# Patient Record
Sex: MALE | Race: BLACK OR AFRICAN AMERICAN | NOT HISPANIC OR LATINO | Employment: STUDENT | URBAN - METROPOLITAN AREA
[De-identification: names, ages, dates, MRNs, and addresses within clinical notes are randomized per-mention and may not be internally consistent; named-entity substitution may affect disease eponyms.]

---

## 2023-03-21 ENCOUNTER — OFFICE VISIT (OUTPATIENT)
Dept: PRIMARY CARE | Facility: CLINIC | Age: 18
End: 2023-03-21
Payer: COMMERCIAL

## 2023-03-21 VITALS
OXYGEN SATURATION: 99 % | DIASTOLIC BLOOD PRESSURE: 82 MMHG | SYSTOLIC BLOOD PRESSURE: 137 MMHG | TEMPERATURE: 98.1 F | BODY MASS INDEX: 37.7 KG/M2 | HEART RATE: 66 BPM | WEIGHT: 240.2 LBS | HEIGHT: 67 IN

## 2023-03-21 DIAGNOSIS — R05.1 ACUTE COUGH: ICD-10-CM

## 2023-03-21 DIAGNOSIS — J45.20 MILD INTERMITTENT ASTHMA WITHOUT COMPLICATION (HHS-HCC): ICD-10-CM

## 2023-03-21 DIAGNOSIS — J02.9 PHARYNGITIS, UNSPECIFIED ETIOLOGY: Primary | ICD-10-CM

## 2023-03-21 PROBLEM — J30.9 ALLERGIC RHINITIS: Status: ACTIVE | Noted: 2023-03-21

## 2023-03-21 PROBLEM — K59.09 CHRONIC CONSTIPATION: Status: ACTIVE | Noted: 2023-03-21

## 2023-03-21 PROBLEM — M54.9 BACK PAIN: Status: ACTIVE | Noted: 2023-03-21

## 2023-03-21 PROBLEM — F90.9 ADHD: Status: ACTIVE | Noted: 2023-03-21

## 2023-03-21 PROBLEM — E66.9 CLASS 2 OBESITY: Status: ACTIVE | Noted: 2023-03-21

## 2023-03-21 PROBLEM — R10.9 ABDOMINAL CRAMPING: Status: ACTIVE | Noted: 2023-03-21

## 2023-03-21 PROBLEM — L30.9 ECZEMA: Status: ACTIVE | Noted: 2023-03-21

## 2023-03-21 PROBLEM — J45.909 ASTHMA (HHS-HCC): Status: ACTIVE | Noted: 2023-03-21

## 2023-03-21 PROBLEM — K21.9 ACID REFLUX: Status: ACTIVE | Noted: 2023-03-21

## 2023-03-21 PROBLEM — E55.9 VITAMIN D DEFICIENCY: Status: ACTIVE | Noted: 2023-03-21

## 2023-03-21 LAB — GROUP A STREP SCREEN, CULTURE: NORMAL

## 2023-03-21 PROCEDURE — 1036F TOBACCO NON-USER: CPT | Performed by: STUDENT IN AN ORGANIZED HEALTH CARE EDUCATION/TRAINING PROGRAM

## 2023-03-21 PROCEDURE — 99213 OFFICE O/P EST LOW 20 MIN: CPT | Performed by: STUDENT IN AN ORGANIZED HEALTH CARE EDUCATION/TRAINING PROGRAM

## 2023-03-21 RX ORDER — ATOMOXETINE 40 MG/1
40 CAPSULE ORAL DAILY
COMMUNITY
Start: 2023-02-24

## 2023-03-21 RX ORDER — IBUPROFEN 600 MG/1
1 TABLET ORAL
COMMUNITY
Start: 2022-11-30

## 2023-03-21 RX ORDER — ALBUTEROL SULFATE 90 UG/1
2 AEROSOL, METERED RESPIRATORY (INHALATION) EVERY 6 HOURS PRN
Qty: 18 G | Refills: 3 | Status: SHIPPED | OUTPATIENT
Start: 2023-03-21

## 2023-03-21 RX ORDER — BUSPIRONE HYDROCHLORIDE 7.5 MG/1
1 TABLET ORAL 2 TIMES DAILY
COMMUNITY
Start: 2023-02-24

## 2023-03-21 RX ORDER — GUAIFENESIN 100 MG/5ML
200 SOLUTION ORAL 3 TIMES DAILY PRN
Qty: 120 ML | Refills: 0 | Status: SHIPPED | OUTPATIENT
Start: 2023-03-21 | End: 2023-03-31

## 2023-03-21 RX ORDER — BUDESONIDE AND FORMOTEROL FUMARATE DIHYDRATE 160; 4.5 UG/1; UG/1
2 AEROSOL RESPIRATORY (INHALATION)
COMMUNITY
Start: 2021-06-15 | End: 2023-03-21 | Stop reason: SDUPTHER

## 2023-03-21 RX ORDER — ALBUTEROL SULFATE 90 UG/1
2 AEROSOL, METERED RESPIRATORY (INHALATION) EVERY 6 HOURS PRN
COMMUNITY
Start: 2017-08-18 | End: 2023-03-21 | Stop reason: SDUPTHER

## 2023-03-21 RX ORDER — BUDESONIDE AND FORMOTEROL FUMARATE DIHYDRATE 160; 4.5 UG/1; UG/1
2 AEROSOL RESPIRATORY (INHALATION)
Qty: 1 EACH | Refills: 2 | Status: SHIPPED | OUTPATIENT
Start: 2023-03-21

## 2023-03-21 RX ORDER — MULTIVITAMIN
1 TABLET ORAL DAILY
COMMUNITY
Start: 2022-08-17

## 2023-03-21 RX ORDER — ARIPIPRAZOLE 15 MG/1
1 TABLET ORAL DAILY
COMMUNITY
Start: 2023-02-24

## 2023-03-21 ASSESSMENT — PAIN SCALES - GENERAL: PAINLEVEL: 0-NO PAIN

## 2023-03-21 NOTE — PROGRESS NOTES
I saw and evaluated the patient. I personally obtained the key and critical portions of the history and physical exam or was physically present for key and critical portions performed by the resident/fellow. I reviewed the resident/fellow's documentation and discussed the patient with the resident/fellow. I agree with the resident/fellow's medical decision making as documented in the note.    Any Puente MD

## 2023-03-21 NOTE — PROGRESS NOTES
"Subjective   Patient ID: Mj Anne is a 17 y.o. male who presents for Sinus Problem.  Mr. Anne is a 17 year old male with a PMHx of asthma presenting today with sore throat that started Friday. Since then, the patient reports sneezing, rhinorrhea, and productive cough. The patient has had sick contacts at home with his mom taking antibiotics for suspected pneumonia and at school with several classmates out due to \"flu\".     ROS: patient denies fever, abdominal pain, diarrhea, dysuria, fatigue    PMHx: Patient has asthma controlled by albuterol inhaler taken as needed. Last time taken 1 week ago. Patient also takes Symbicort, but has not have a refill in a while and currently takes it once a week. Patient also reports sinus issues treated with Flonase.     Patient has received flu shot and all COVID shots (including 1 booster). Patient states he is fully vaccinated.         Objective     /82 (BP Location: Right arm, Patient Position: Sitting, BP Cuff Size: Adult long)   Pulse 66   Temp 36.7 °C (98.1 °F) (Temporal)   Ht 1.702 m (5' 7\")   Wt 109 kg (240 lb 3.2 oz)   SpO2 99%   BMI 37.62 kg/m²     Physical Exam  General: Well appearing, not in acute distress  HEENT: Tonsils swollen grade 1 with exudate. Nontender left sided cervical LAD on palpation. Tympanic membrane unremarkable bilaterally.  CV: Normal rate and rhythm.   Lungs: Clear to auscultation bilaterally. No wheezing, rhonci or rales.   Abd: nontender to palpation.     Assessment/Plan   Mj Anne is a 17 y.o. male with PMHx of Asthma presenting today with sore throat and viral prodromal symptoms. Given the patient's cough, lack of fever and age, strep pharyngitis is unlikely. Viral pharyngitis is most likely. However, given contact with mom who has possible bacterial infection and tonsil findings/LAD, getting a rapid strep test to rule out strep pharyngitis.     Plan  #SoreThroat  Rapid Strep Test sent to lab  Add cough syrup " for symptomatic relief  Discussed ongoing symptomatic management with tylenol and ibuprofen as needed  School note provided  #Asthma  Mild-intermittent  Refilled asthma medications  Continue to monitor    Diagnoses and all orders for this visit:  Pharyngitis, unspecified etiology  -     guaiFENesin (Robitussin) 100 mg/5 mL syrup; Take 10 mL (200 mg) by mouth 3 times a day as needed for cough for up to 10 days.  -     Group A Streptococcus, Culture  Mild intermittent asthma without complication  -     albuterol 90 mcg/actuation inhaler; Inhale 2 puffs every 6 hours if needed for shortness of breath.  -     budesonide-formoteroL (Symbicort) 160-4.5 mcg/actuation inhaler; Inhale 2 puffs  in the morning and 2 puffs before bedtime.  Acute cough  -     guaiFENesin (Robitussin) 100 mg/5 mL syrup; Take 10 mL (200 mg) by mouth 3 times a day as needed for cough for up to 10 days.      Patient seen and discussed with attending: Dr. Kwame Grace MD  Family Medicine  PGY3

## 2023-03-21 NOTE — LETTER
March 21, 2023     Patient: Mj Anne   YOB: 2005   Date of Visit: 3/21/2023       To Whom It May Concern:    Mj Anne was seen in my clinic on 3/21/2023 at 1:00 pm. Please excuse Mj for his absence from school on this day to make the appointment.    If you have any questions or concerns, please don't hesitate to call.         Sincerely,         Babak Grace MD        CC: No Recipients